# Patient Record
Sex: MALE | Race: BLACK OR AFRICAN AMERICAN | NOT HISPANIC OR LATINO
[De-identification: names, ages, dates, MRNs, and addresses within clinical notes are randomized per-mention and may not be internally consistent; named-entity substitution may affect disease eponyms.]

---

## 2021-06-10 PROBLEM — Z00.00 ENCOUNTER FOR PREVENTIVE HEALTH EXAMINATION: Status: ACTIVE | Noted: 2021-06-10

## 2021-06-11 ENCOUNTER — APPOINTMENT (OUTPATIENT)
Dept: ENDOCRINOLOGY | Facility: CLINIC | Age: 41
End: 2021-06-11
Payer: COMMERCIAL

## 2021-06-11 VITALS
WEIGHT: 213 LBS | OXYGEN SATURATION: 97 % | HEART RATE: 87 BPM | DIASTOLIC BLOOD PRESSURE: 82 MMHG | BODY MASS INDEX: 33.43 KG/M2 | HEIGHT: 67 IN | SYSTOLIC BLOOD PRESSURE: 120 MMHG

## 2021-06-11 DIAGNOSIS — Z78.9 OTHER SPECIFIED HEALTH STATUS: ICD-10-CM

## 2021-06-11 DIAGNOSIS — Z83.3 FAMILY HISTORY OF DIABETES MELLITUS: ICD-10-CM

## 2021-06-11 LAB — GLUCOSE BLDC GLUCOMTR-MCNC: 123

## 2021-06-11 PROCEDURE — 82962 GLUCOSE BLOOD TEST: CPT

## 2021-06-11 PROCEDURE — 99203 OFFICE O/P NEW LOW 30 MIN: CPT | Mod: 25

## 2021-06-11 PROCEDURE — 99072 ADDL SUPL MATRL&STAF TM PHE: CPT

## 2021-06-11 RX ORDER — SITAGLIPTIN AND METFORMIN HYDROCHLORIDE 50; 1000 MG/1; MG/1
50-1000 TABLET, FILM COATED ORAL
Refills: 0 | Status: ACTIVE | COMMUNITY

## 2021-06-11 RX ORDER — RAMIPRIL 2.5 MG/1
2.5 CAPSULE ORAL
Refills: 0 | Status: ACTIVE | COMMUNITY

## 2021-06-11 RX ORDER — EMPAGLIFLOZIN 10 MG/1
10 TABLET, FILM COATED ORAL
Refills: 0 | Status: ACTIVE | COMMUNITY

## 2021-06-11 RX ORDER — ASPIRIN ENTERIC COATED TABLETS 81 MG 81 MG/1
81 TABLET, DELAYED RELEASE ORAL
Refills: 0 | Status: ACTIVE | COMMUNITY

## 2021-06-11 RX ORDER — CHROMIUM 200 MCG
TABLET ORAL
Refills: 0 | Status: ACTIVE | COMMUNITY

## 2021-06-11 RX ORDER — MULTIVIT-MIN/FOLIC/VIT K/LYCOP 400-300MCG
1000 TABLET ORAL
Refills: 0 | Status: ACTIVE | COMMUNITY

## 2021-06-11 RX ORDER — ATORVASTATIN CALCIUM 10 MG/1
10 TABLET, FILM COATED ORAL
Refills: 0 | Status: ACTIVE | COMMUNITY

## 2021-06-11 NOTE — REVIEW OF SYSTEMS
[Gas/Bloating] : gas/bloating [Fatigue] : no fatigue [Decreased Appetite] : appetite not decreased [Recent Weight Gain (___ Lbs)] : no recent weight gain [Recent Weight Loss (___ Lbs)] : no recent weight loss [Visual Field Defect] : no visual field defect [Blurred Vision] : no blurred vision [Dysphagia] : no dysphagia [Neck Pain] : no neck pain [Dysphonia] : no dysphonia [Chest Pain] : no chest pain [Palpitations] : no palpitations [Constipation] : no constipation [Diarrhea] : no diarrhea [Polyuria] : no polyuria [Dysuria] : no dysuria [Headaches] : no headaches [Tremors] : no tremors [Depression] : no depression [Anxiety] : no anxiety [Polydipsia] : no polydipsia [Swelling] : no swelling [FreeTextEntry2] : weight stable

## 2021-06-11 NOTE — PHYSICAL EXAM
[Alert] : alert [Well Nourished] : well nourished [No Acute Distress] : no acute distress [Well Developed] : well developed [Normal Sclera/Conjunctiva] : normal sclera/conjunctiva [EOMI] : extra ocular movement intact [No LAD] : no lymphadenopathy [Thyroid Not Enlarged] : the thyroid was not enlarged [No Thyroid Nodules] : no palpable thyroid nodules [No Respiratory Distress] : no respiratory distress [No Accessory Muscle Use] : no accessory muscle use [Normal Rate and Effort] : normal respiratory rate and effort [Clear to Auscultation] : lungs were clear to auscultation bilaterally [Normal S1, S2] : normal S1 and S2 [Normal Rate] : heart rate was normal [Regular Rhythm] : with a regular rhythm [No Edema] : no peripheral edema [Pedal Pulses Normal] : the pedal pulses are present [Normal Bowel Sounds] : normal bowel sounds [Not Tender] : non-tender [Soft] : abdomen soft [Normal Gait] : normal gait [No Rash] : no rash [Foot Ulcers] : no foot ulcers [Right Foot Was Examined] : right foot ~C was examined [Left Foot Was Examined] : left foot ~C was examined [Normal] : normal [Diminished Throughout Both Feet] : normal tactile sensation with monofilament testing throughout both feet [No Tremors] : no tremors [Oriented x3] : oriented to person, place, and time [Normal Affect] : the affect was normal [Normal Insight/Judgement] : insight and judgment were intact [Normal Mood] : the mood was normal

## 2021-06-11 NOTE — ASSESSMENT
[Diabetes Foot Care] : diabetes foot care [Importance of Diet and Exercise] : importance of diet and exercise to improve glycemic control, achieve weight loss and improve cardiovascular health [Retinopathy Screening] : Patient was referred to ophthalmology for retinopathy screening [FreeTextEntry1] : 39 y/o male with Type 2 DM and HLD. \par \par Plan: \par Type 2 DM: suboptimal control \par - continue current Rx - may need to increase Jardiance at next visit if blood sugars do not improve on lower dose \par - needs to work on diet and increase routine exercise, 150 minutes a week \par - start self blood sugar monitoring 2 times a day, fasting and 2 hours after a meal \par - bring in logs and meter to next visit \par - schedule appointment with CDE for diet education \par - keep appointment with ophthalmology, stressed the importance of annual retinopathy screening\par - continue to follow up with podiatry \par \par HLD: LDL is elevated, continue statin \par - follow a low fat diet \par \par Continue to follow up with Cardiology due to questionable heart valve dysfunction \par \par RTO in 6 weeks

## 2021-06-11 NOTE — HISTORY OF PRESENT ILLNESS
[FreeTextEntry1] : Quality: Type 2 DM \par Severity: suboptimal\par Duration: over 5 years \par Onset: routine labs \par Modifying Factors: \par Associated Symptoms:\par Family History: Maternal Aunts with diabetes \par \par Current Regimen:\par Janumet  mg 1 tab daily \par Jardiance 10 mg daily - started medication last week \par \par Self blood sugar monitoring: none \par Current \par \par Labs: \par 5/21/21\par A1C 8.8%\par \par Cholesterol 187\par \par \par \par exercise: rarely \par \par Diet: limiting rice, blend fruit, water and honey\par B- coffee, blend fruit drink \par L- salad, fish \par D- corn, pasta \par Snacks- bar snacks with dark chocolate \par \par \par Date of last eye exam: 2 years ago (-) , eye appointment next Thursday \par Date of last foot exam: 1 week ago with podiatry, denies neuropathy  \par Date of last flu vaccine: no \par Date of last Pneumovax: no\par \par Notes: \par Patient reports he had a whole body MRI due to muscle fatigue and was told he has a problem with heart valve. He has appointment with Cardiology in July. \par \par PMH:\par HLD \par COVID in past and lost sense of taste, having some symptoms at times

## 2021-07-08 LAB — HBA1C MFR BLD HPLC: 8.8

## 2021-07-09 ENCOUNTER — APPOINTMENT (OUTPATIENT)
Dept: ENDOCRINOLOGY | Facility: CLINIC | Age: 41
End: 2021-07-09
Payer: COMMERCIAL

## 2021-07-09 PROCEDURE — 99072 ADDL SUPL MATRL&STAF TM PHE: CPT

## 2021-07-09 PROCEDURE — G0108 DIAB MANAGE TRN  PER INDIV: CPT

## 2021-07-22 LAB
LDLC SERPL DIRECT ASSAY-MCNC: 119
MICROALBUMIN/CREAT 24H UR-RTO: 3

## 2021-07-23 ENCOUNTER — APPOINTMENT (OUTPATIENT)
Dept: ENDOCRINOLOGY | Facility: CLINIC | Age: 41
End: 2021-07-23
Payer: COMMERCIAL

## 2021-07-23 VITALS
SYSTOLIC BLOOD PRESSURE: 120 MMHG | BODY MASS INDEX: 32.65 KG/M2 | DIASTOLIC BLOOD PRESSURE: 80 MMHG | HEART RATE: 77 BPM | HEIGHT: 67 IN | OXYGEN SATURATION: 97 % | WEIGHT: 208 LBS

## 2021-07-23 DIAGNOSIS — E78.5 HYPERLIPIDEMIA, UNSPECIFIED: ICD-10-CM

## 2021-07-23 DIAGNOSIS — E11.9 TYPE 2 DIABETES MELLITUS W/OUT COMPLICATIONS: ICD-10-CM

## 2021-07-23 LAB — GLUCOSE BLDC GLUCOMTR-MCNC: 160

## 2021-07-23 PROCEDURE — 99072 ADDL SUPL MATRL&STAF TM PHE: CPT

## 2021-07-23 PROCEDURE — 82962 GLUCOSE BLOOD TEST: CPT

## 2021-07-23 PROCEDURE — 99214 OFFICE O/P EST MOD 30 MIN: CPT | Mod: 25

## 2021-07-23 NOTE — PHYSICAL EXAM
[Alert] : alert [Well Nourished] : well nourished [No Acute Distress] : no acute distress [Well Developed] : well developed [Normal Sclera/Conjunctiva] : normal sclera/conjunctiva [EOMI] : extra ocular movement intact [No LAD] : no lymphadenopathy [Thyroid Not Enlarged] : the thyroid was not enlarged [No Thyroid Nodules] : no palpable thyroid nodules [No Respiratory Distress] : no respiratory distress [No Accessory Muscle Use] : no accessory muscle use [Normal Rate and Effort] : normal respiratory rate and effort [Clear to Auscultation] : lungs were clear to auscultation bilaterally [Normal S1, S2] : normal S1 and S2 [Normal Rate] : heart rate was normal [Regular Rhythm] : with a regular rhythm [No Edema] : no peripheral edema [Normal Bowel Sounds] : normal bowel sounds [Not Tender] : non-tender [Soft] : abdomen soft [Normal Gait] : normal gait [No Rash] : no rash [Acanthosis Nigricans] : no acanthosis nigricans [No Tremors] : no tremors [Oriented x3] : oriented to person, place, and time [Normal Affect] : the affect was normal [Normal Insight/Judgement] : insight and judgment were intact [Normal Mood] : the mood was normal

## 2021-07-23 NOTE — REVIEW OF SYSTEMS
[Recent Weight Loss (___ Lbs)] : recent weight loss: [unfilled] lbs [Fatigue] : no fatigue [Decreased Appetite] : appetite not decreased [Visual Field Defect] : no visual field defect [Blurred Vision] : no blurred vision [Dysphagia] : no dysphagia [Neck Pain] : no neck pain [Dysphonia] : no dysphonia [Chest Pain] : no chest pain [Palpitations] : no palpitations [Constipation] : no constipation [Diarrhea] : no diarrhea [Polyuria] : no polyuria [Dysuria] : no dysuria [Headaches] : no headaches [Tremors] : no tremors [Depression] : no depression [Anxiety] : no anxiety [Polydipsia] : no polydipsia

## 2021-07-23 NOTE — HISTORY OF PRESENT ILLNESS
[FreeTextEntry1] : Quality: Type 2 DM \par Severity: suboptimal\par Duration: over 5 years \par Onset: routine labs \par Modifying Factors: \par Associated Symptoms:\par Family History: Maternal Aunts with diabetes \par \par Current Regimen:\par Janumet  mg 1 tab daily \par Jardiance 10 mg daily - started medication last week \par \par Self blood sugar monitorin- 4 times a day\par before breakfast: 119 (today), 124, 120, 124, 121, 152 (missed dose of medication), 131\par after breakfast: 128, 149, 119\par before lunch: 90, 102, 106\par after lunch: 152, 131\par before dinner: 97, 100, 112\par after dinner: 121, 119, 116\par bedtime: 109\par Current  - ate 1 hour ago, coffee with sugar and bread\par \par Weight: loss 5 pounds\par \par Labs: \par 21\par A1C 8.8%\par \par Cholesterol 187\par \par \par \par exercise: 4 times a week, push ups \par \par Diet: limiting rice, blend fruit, water and honey\par B- coffee, blend fruit drink \par L- salad, fish \par D- corn, pasta \par Snacks- bar snacks with dark chocolate \par \par \par Date of last eye exam: 2021 (-) Dr\par Date of last foot exam: 1 week ago with podiatry, denies neuropathy  \par Date of last flu vaccine: no \par Date of last Pneumovax: no\par \par Notes: \par Patient reports he had a whole body MRI due to muscle fatigue and was told he has a problem with heart valve. Seen Cardiology "everything is fine" \par \par PMH:\par HLD \par COVID in past and lost sense of taste, having some symptoms at times

## 2021-07-23 NOTE — ASSESSMENT
[FreeTextEntry1] : 41 y/o male with Type 2 DM and HLD. \par \par Plan: \par Type 2 DM: blood sugars improving \par - continue current Rx \par - needs to work on diet and increase routine exercise, 150 minutes a week \par - continue self blood sugar monitoring 2 times a day, fasting and 2 hours after a meal \par - bring in logs and meter to next visit \par - continue to follow up with ophthalmology, stressed the importance of annual retinopathy screening\par - continue to follow up with podiatry \par - repeat A1C before next visit \par \par HLD: continue statin \par - follow a low fat diet \par - repeat lipids before next visit \par \par \par RTO in September  [Importance of Diet and Exercise] : importance of diet and exercise to improve glycemic control, achieve weight loss and improve cardiovascular health [Retinopathy Screening] : Patient was referred to ophthalmology for retinopathy screening

## 2021-09-14 ENCOUNTER — APPOINTMENT (OUTPATIENT)
Dept: ENDOCRINOLOGY | Facility: CLINIC | Age: 41
End: 2021-09-14

## 2023-01-17 ENCOUNTER — OFFICE (OUTPATIENT)
Dept: URBAN - METROPOLITAN AREA CLINIC 12 | Facility: CLINIC | Age: 43
Setting detail: OPHTHALMOLOGY
End: 2023-01-17
Payer: COMMERCIAL

## 2023-01-17 DIAGNOSIS — E11.9: ICD-10-CM

## 2023-01-17 DIAGNOSIS — H52.7: ICD-10-CM

## 2023-01-17 DIAGNOSIS — H52.13: ICD-10-CM

## 2023-01-17 DIAGNOSIS — H43.393: ICD-10-CM

## 2023-01-17 PROCEDURE — 92014 COMPRE OPH EXAM EST PT 1/>: CPT | Performed by: STUDENT IN AN ORGANIZED HEALTH CARE EDUCATION/TRAINING PROGRAM

## 2023-01-17 PROCEDURE — 92250 FUNDUS PHOTOGRAPHY W/I&R: CPT | Performed by: STUDENT IN AN ORGANIZED HEALTH CARE EDUCATION/TRAINING PROGRAM

## 2023-01-17 PROCEDURE — 92015 DETERMINE REFRACTIVE STATE: CPT | Performed by: STUDENT IN AN ORGANIZED HEALTH CARE EDUCATION/TRAINING PROGRAM

## 2023-01-17 ASSESSMENT — REFRACTION_MANIFEST
OS_CYLINDER: SPHERE
OS_SPHERE: -1.00
OS_CYLINDER: SPHERE
OD_CYLINDER: SPHERE
OS_VA1: 20/20-1
OD_SPHERE: -1.00
OS_SPHERE: -1.00
OS_AXIS: 0
OS_VA1: 20/15
OD_AXIS: 0
OD_VA1: 20/15
OD_VA1: 20/20
OD_CYLINDER: SPHERE
OD_SPHERE: -1.00

## 2023-01-17 ASSESSMENT — KERATOMETRY
OS_K1POWER_DIOPTERS: 44.50
OD_K1POWER_DIOPTERS: 44.00
OS_K2POWER_DIOPTERS: 45.50
OD_K2POWER_DIOPTERS: 45.25
OD_AXISANGLE_DEGREES: 087
OS_AXISANGLE_DEGREES: 092

## 2023-01-17 ASSESSMENT — REFRACTION_CURRENTRX
OD_AXIS: 079
OD_SPHERE: -0.75
OS_SPHERE: -1.25
OD_OVR_VA: 20/
OS_AXIS: 0
OD_CYLINDER: -0.25
OS_OVR_VA: 20/
OD_VPRISM_DIRECTION: SV
OS_CYLINDER: SPHERE
OS_VPRISM_DIRECTION: SV

## 2023-01-17 ASSESSMENT — REFRACTION_AUTOREFRACTION
OS_AXIS: 078
OD_CYLINDER: SPHERE
OD_AXIS: 0
OD_SPHERE: -1.00
OS_CYLINDER: -0.25
OS_SPHERE: -1.25

## 2023-01-17 ASSESSMENT — TONOMETRY
OD_IOP_MMHG: 15
OS_IOP_MMHG: 16

## 2023-01-17 ASSESSMENT — VISUAL ACUITY
OD_BCVA: 20/20-1
OS_BCVA: 20/20-1

## 2023-01-17 ASSESSMENT — CONFRONTATIONAL VISUAL FIELD TEST (CVF)
OD_FINDINGS: FULL
OS_FINDINGS: FULL

## 2023-01-17 ASSESSMENT — AXIALLENGTH_DERIVED: OS_AL: 23.5767

## 2023-01-17 ASSESSMENT — SPHEQUIV_DERIVED: OS_SPHEQUIV: -1.375

## 2025-06-16 ENCOUNTER — OFFICE (OUTPATIENT)
Dept: URBAN - METROPOLITAN AREA CLINIC 12 | Facility: CLINIC | Age: 45
Setting detail: OPHTHALMOLOGY
End: 2025-06-16
Payer: COMMERCIAL

## 2025-06-16 DIAGNOSIS — E11.9: ICD-10-CM

## 2025-06-16 DIAGNOSIS — H43.393: ICD-10-CM

## 2025-06-16 PROCEDURE — 92014 COMPRE OPH EXAM EST PT 1/>: CPT | Performed by: STUDENT IN AN ORGANIZED HEALTH CARE EDUCATION/TRAINING PROGRAM

## 2025-06-16 ASSESSMENT — VISUAL ACUITY
OS_BCVA: 20/25+2
OD_BCVA: 20/25-1

## 2025-06-16 ASSESSMENT — KERATOMETRY
OS_K2POWER_DIOPTERS: 45.00
OD_K2POWER_DIOPTERS: 45.00
OS_K1POWER_DIOPTERS: 44.50
OS_AXISANGLE_DEGREES: 093
OD_K1POWER_DIOPTERS: 44.00
OD_AXISANGLE_DEGREES: 086

## 2025-06-16 ASSESSMENT — PACHYMETRY
OD_CT_CORRECTION: -1
OS_CT_CORRECTION: -1
OS_CT_UM: 568
OD_CT_UM: 564

## 2025-06-16 ASSESSMENT — REFRACTION_CURRENTRX
OS_SPHERE: -1.25
OS_CYLINDER: SPHERE
OD_AXIS: 0
OD_OVR_VA: 20/
OS_AXIS: 0
OD_CYLINDER: SPH
OD_VPRISM_DIRECTION: SV
OD_SPHERE: -0.75
OS_VPRISM_DIRECTION: SV
OS_OVR_VA: 20/

## 2025-06-16 ASSESSMENT — REFRACTION_MANIFEST
OS_CYLINDER: SPHERE
OS_VA1: 20/20-1
OS_SPHERE: -1.00
OS_AXIS: 0
OD_SPHERE: -1.00
OS_VA1: 20/15
OD_AXIS: 0
OD_VA1: 20/20
OS_CYLINDER: SPHERE
OD_SPHERE: -1.00
OD_VA1: 20/15
OD_CYLINDER: SPHERE
OS_SPHERE: -1.00
OD_CYLINDER: SPHERE

## 2025-06-16 ASSESSMENT — REFRACTION_AUTOREFRACTION
OS_CYLINDER: -0.75
OD_AXIS: 087
OS_AXIS: 085
OD_CYLINDER: -0.25
OS_SPHERE: -0.50
OD_SPHERE: -0.75

## 2025-06-16 ASSESSMENT — CONFRONTATIONAL VISUAL FIELD TEST (CVF)
OD_FINDINGS: FULL
OS_FINDINGS: FULL

## 2025-06-16 ASSESSMENT — TONOMETRY: OD_IOP_MMHG: 12
